# Patient Record
Sex: FEMALE | Race: BLACK OR AFRICAN AMERICAN | HISPANIC OR LATINO
[De-identification: names, ages, dates, MRNs, and addresses within clinical notes are randomized per-mention and may not be internally consistent; named-entity substitution may affect disease eponyms.]

---

## 2022-07-14 PROBLEM — Z00.00 ENCOUNTER FOR PREVENTIVE HEALTH EXAMINATION: Status: ACTIVE | Noted: 2022-07-14

## 2022-08-08 ENCOUNTER — APPOINTMENT (OUTPATIENT)
Dept: OTOLARYNGOLOGY | Facility: CLINIC | Age: 57
End: 2022-08-08

## 2022-08-08 VITALS
DIASTOLIC BLOOD PRESSURE: 84 MMHG | HEIGHT: 62 IN | TEMPERATURE: 97.8 F | OXYGEN SATURATION: 98 % | WEIGHT: 123 LBS | BODY MASS INDEX: 22.63 KG/M2 | SYSTOLIC BLOOD PRESSURE: 126 MMHG | HEART RATE: 90 BPM

## 2022-08-08 DIAGNOSIS — R43.0 ANOSMIA: ICD-10-CM

## 2022-08-08 PROCEDURE — 99203 OFFICE O/P NEW LOW 30 MIN: CPT | Mod: 25

## 2022-08-08 PROCEDURE — 31231 NASAL ENDOSCOPY DX: CPT

## 2022-08-08 NOTE — DATA REVIEWED
[de-identified] : –\par MRI brain 1/25/2022\par  impression: No evidence of planum sphenoidal tumor.  No evidence of intra or extra-axial enhancing neoplasm.  No suspicious meningeal enhancement.\par No evidence of demyelinating plaques, hemorrhage or acute infarction.\par   Few small areas of gliosis in the white matter of both cerebral hemispheres sequela most probably to longstanding hypertension or small vessel arthrosclerosis.\par Satisfactory pneumatization of the facial sinuses and mastoid cells.

## 2022-08-08 NOTE — PROCEDURE
[FreeTextEntry6] : -\par Procedure Note\par   \par Pre-operative Diagnosis:  anosmia\par Post-operative Diagnosis:   normal exam, no evidence of any obstruction of the olfactory cleft\par Anesthesia: Topical\par Procedure: Bilateral nasal endoscopy\par   \par Procedure Details: \par After topical anesthesia and decongestant, the patient was placed in the supine position. The telescope was passed along the left nasal floor to the nasopharynx. It was then passed into the region of the middle meatus, middle turbinate, and the sphenoethmoid region.  An identical procedure was performed on the right side. \par   \par Findings: \par Mucosa: 	                normal	\par Nasal septum: 	midline 	\par Discharge: 	none	\par Turbinates: 	normal	\par Adenoid: 	                normal	\par Posterior choanae: 	normal	\par Eustachian tubes: 	normal	\par Mucous stranding: 	normal 	\par Lesions: 	                Not present	\par   \par Comments: \par Condition: Stable. Patient tolerated procedure well.\par Complications: None\par \par

## 2022-08-08 NOTE — HISTORY OF PRESENT ILLNESS
[de-identified] :  56-year-old female who presents with concern for anosmia.  She states that her symptoms first began over 2 years ago.  At that time she notes that she had an upper respiratory infection with loss of smell and taste.  Her  had something similar but his taste returned.  While this was at the beginning of COVID she was tested and she was COVID-negative.  She denies any significant congestion, facial pain, dental pain, rhinorrhea.  To treat this she has seen an outside doctor and she did try smell training x1 month but then stopped as she saw no improvement.  She did have an MRI completed as well and this was negative for any sinonasal inflammatory disease or mass lesions.\par \par   Currently she is able to taste sweet, better, sour and salty.  She is however unable to differentiate different tastes for example a peach from a pair.

## 2022-08-08 NOTE — DATA REVIEWED
[de-identified] : –\par MRI brain 1/25/2022\par  impression: No evidence of planum sphenoidal tumor.  No evidence of intra or extra-axial enhancing neoplasm.  No suspicious meningeal enhancement.\par No evidence of demyelinating plaques, hemorrhage or acute infarction.\par   Few small areas of gliosis in the white matter of both cerebral hemispheres sequela most probably to longstanding hypertension or small vessel arthrosclerosis.\par Satisfactory pneumatization of the facial sinuses and mastoid cells.

## 2022-08-08 NOTE — HISTORY OF PRESENT ILLNESS
[de-identified] :  56-year-old female who presents with concern for anosmia.  She states that her symptoms first began over 2 years ago.  At that time she notes that she had an upper respiratory infection with loss of smell and taste.  Her  had something similar but his taste returned.  While this was at the beginning of COVID she was tested and she was COVID-negative.  She denies any significant congestion, facial pain, dental pain, rhinorrhea.  To treat this she has seen an outside doctor and she did try smell training x1 month but then stopped as she saw no improvement.  She did have an MRI completed as well and this was negative for any sinonasal inflammatory disease or mass lesions.\par \par   Currently she is able to taste sweet, better, sour and salty.  She is however unable to differentiate different tastes for example a peach from a pair.

## 2022-08-08 NOTE — ASSESSMENT
[FreeTextEntry1] : 56-year-old female who presents with anosmia.  Based on history and physical exam nontender sure why she is having the symptoms.  I suspect it was related to an upper respiratory infection, likely still COVID in my opinion despite testing negative very early on in the pandemic.  Imaging has been negative for a neurologic or sinonasal inflammatory process.  At this time I am recommending smell training for  a total of 5 months.  Patient to follow-up after to review results.\par \par – Smell training for full 5 months\par – Follow-up after to review results

## 2022-08-10 ENCOUNTER — APPOINTMENT (OUTPATIENT)
Dept: ORTHOPEDIC SURGERY | Facility: CLINIC | Age: 57
End: 2022-08-10

## 2022-08-10 VITALS — WEIGHT: 123 LBS | BODY MASS INDEX: 22.63 KG/M2 | HEIGHT: 62 IN

## 2022-08-10 DIAGNOSIS — Z80.51 FAMILY HISTORY OF MALIGNANT NEOPLASM OF KIDNEY: ICD-10-CM

## 2022-08-10 DIAGNOSIS — Z78.9 OTHER SPECIFIED HEALTH STATUS: ICD-10-CM

## 2022-08-10 DIAGNOSIS — Z80.0 FAMILY HISTORY OF MALIGNANT NEOPLASM OF DIGESTIVE ORGANS: ICD-10-CM

## 2022-08-10 DIAGNOSIS — Z86.39 PERSONAL HISTORY OF OTHER ENDOCRINE, NUTRITIONAL AND METABOLIC DISEASE: ICD-10-CM

## 2022-08-10 PROCEDURE — 72110 X-RAY EXAM L-2 SPINE 4/>VWS: CPT

## 2022-08-10 PROCEDURE — 99204 OFFICE O/P NEW MOD 45 MIN: CPT

## 2022-08-10 RX ORDER — LEVOTHYROXINE SODIUM 137 UG/1
TABLET ORAL
Refills: 0 | Status: ACTIVE | COMMUNITY

## 2022-08-10 NOTE — ASSESSMENT
[FreeTextEntry1] : 56 year old female presents with acute exacerbation of chronic back pain. The pain radiates down the back of her lower extremities. She is otherwise neurologically intact.We reviewed her imaging including a recent lumbar MRI. We discussed treatment options including physical therapy, medications, spinal injections and surgery. The patient was given a referral for physical therapy. The patient was given a referral for consideration for spinal injections. She was given prescriptions for diclofenac, cyclobenzaprine and gabapentin. She will followup in 4-6 weeks. We discussed red flag symptoms that would require emergent evaluation. She knows to call with any questions or concerns or if her symptoms acutely worsen.

## 2022-08-10 NOTE — PHYSICAL EXAM
[de-identified] : General: No acute distress, conversant, well-nourished.\par Head: Normocephalic, atraumatic\par Neck: trachea midline, FROM\par Heart: normotensive and normal rate and rhythm\par Lungs: No labored breathing\par Skin: No abrasions, no rashes, no edema\par Psych: Alert and oriented to person, place and time\par Extremities: no peripheral edema or digital cyanosis\par Gait: Normal gait. Can perform tandem gait.  \par Vascular: warm and well perfused distally, palpable distal pulses\par \par MSK:\par Lumbar spine:\par No tenderness to palpation.  No step-off, no deformity.\par \par NEURO EXAM:\par Sensation \par Left L2  -  2/2            \par Left L3  -  2/2\par Left L4  -  2/2\par Left L5  -  2/2\par Left S1  -  2/2\par \par Right L2  -  2/2            \par Right L3  -  2/2\par Right L4  -  2/2\par Right L5  -  2/2\par Right S1  -  2/2\par \par Motor: \par Left L2 (hip flexion)                            5/5                \par Left L3 (knee extension)                   5/5                \par Left L4 (ankle dorsiflexion)                 5/5                \par Left L5 (long toe extensor)                5/5                \par Left S1 (ankle plantar flexion)           5/5\par \par Right L2 (hip flexion)                            5/5                \par Right L3 (knee extension)                   5/5                \par Right L4 (ankle dorsiflexion)                 5/5                \par Right L5 (long toe extensor)                5/5                \par Right S1 (ankle plantar flexion)           5/5\par \par Reflexes: Normal and symmetric\par Negative clonus.  Down-going Babinski.   [de-identified] : I ordered radiographs to evaluate the patient's symptoms.\par \par Lumbar 4 view radiographs taken in the office today show no dislocation or fracture.  Lumbar spondylosis.  L4-L5 spondylolisthesis with mild movement on dynamic series.\par \par Lumbar MRI (8/3/22): Degenerative changes, L4-L5 anterolisthesis. Multilevel stenosis. No compression of neural elements.

## 2022-08-10 NOTE — HISTORY OF PRESENT ILLNESS
[de-identified] : 56 year old female presents with acute exacerbation of chronic back pain. The pain radiates down the back of her lower extremities. She denies recent illness, fevers, numbness, weakness, balance problems, saddle anesthesia, urinary retention or fecal incontinence. She had spinal injections in the past which helped and is interested in injections.  She takes ibuprofen without relief.  She has tried PT in the past without improvement.  She has a recent lumbar MRI but without the report.

## 2022-08-31 ENCOUNTER — APPOINTMENT (OUTPATIENT)
Dept: PHYSICAL MEDICINE AND REHAB | Facility: CLINIC | Age: 57
End: 2022-08-31

## 2022-08-31 DIAGNOSIS — M51.26 OTHER INTERVERTEBRAL DISC DISPLACEMENT, LUMBAR REGION: ICD-10-CM

## 2022-08-31 PROCEDURE — 99203 OFFICE O/P NEW LOW 30 MIN: CPT

## 2022-09-01 NOTE — PHYSICAL EXAM
[FreeTextEntry1] : DOUGLAS is a 56 year old female \par Constitutional: healthy appearing, NAD, and normal body habitus\par \par LUMBAR\par ROM: flexion to 30 deg, ext to 5 deg\par \par Gait: normal\par \par Inspection: no erythema, warmth\par Spine: no TTP in spinous process\par Bony palpation: no TTP in GT\par \par Soft tissue palpation hip: no TTP in gluteus girish\par Soft tissue palpation of spine: no TTP in lumbar paraspinals\par \par 5/5 bilateral KE, DF, PF \par sensation intact in bilat LE\par 0 ankle reflex\par \par Special tests: neg seated SLR\par \par

## 2022-09-01 NOTE — ASSESSMENT
[FreeTextEntry1] : MRI shows L4/5 stenosis with listhesis.\par \par Discussed diagnosis and treatment plan including PT.\par Discussed HOLLY again given radicular pain.  She elects to do it.  Stop nsaids 2 days before.\par Limit sitting.  Taught hamstring stretch.

## 2022-09-01 NOTE — HISTORY OF PRESENT ILLNESS
[FreeTextEntry1] : Location: back\par Severity: moderate\par Duration: years\par Aggravating Factors: walking\par Alleviating Factors: HOLLY helped significantly years ago\par Associated Symptoms: denies weight loss, fever, chills, change in bowel/bladder habits, weakness, numbness/tingling, +radiation down legs\par Prior Studies: MRI\par

## 2022-09-14 ENCOUNTER — APPOINTMENT (OUTPATIENT)
Dept: ORTHOPEDIC SURGERY | Facility: CLINIC | Age: 57
End: 2022-09-14

## 2022-10-05 ENCOUNTER — RX RENEWAL (OUTPATIENT)
Age: 57
End: 2022-10-05

## 2022-10-10 ENCOUNTER — RX RENEWAL (OUTPATIENT)
Age: 57
End: 2022-10-10

## 2022-11-07 ENCOUNTER — APPOINTMENT (OUTPATIENT)
Dept: OTOLARYNGOLOGY | Facility: CLINIC | Age: 57
End: 2022-11-07

## 2022-12-04 ENCOUNTER — RX RENEWAL (OUTPATIENT)
Age: 57
End: 2022-12-04

## 2022-12-14 ENCOUNTER — APPOINTMENT (OUTPATIENT)
Dept: PHYSICAL MEDICINE AND REHAB | Facility: CLINIC | Age: 57
End: 2022-12-14

## 2022-12-14 PROCEDURE — 99214 OFFICE O/P EST MOD 30 MIN: CPT

## 2022-12-14 NOTE — PHYSICAL EXAM
[FreeTextEntry1] : DOUGLAS is a 57 year old female \par Constitutional: healthy appearing, NAD, and normal body habitus\par \par LUMBAR\par ROM: flexion to 30 deg, ext to 5 deg\par \par Gait: normal\par \par Inspection: no erythema, warmth\par \par Soft tissue palpation hip: no TTP in gluteus girish\par Soft tissue palpation of spine: no TTP in lumbar paraspinals\par \par 5/5 bilateral KE, DF, PF \par sensation intact in bilat LE\par

## 2022-12-14 NOTE — ASSESSMENT
[FreeTextEntry1] : MRI shows L4/5 stenosis with listhesis.\par \par Discussed diagnosis and treatment plan including PT.\par Educated to do HEP regularly even if pain improves.\par Does not want injections yet.  Consider facet injections\par Will keep meds the same since has minimal pain with 900 mg Gabapentin and cyclobenzaprine qhs prn.\par \par Seen and examined with fellow Dr Duglas Kemp.

## 2022-12-14 NOTE — HISTORY OF PRESENT ILLNESS
[FreeTextEntry1] : Location: back\par Severity: mild with meds\par Duration: years\par Aggravating Factors: walking\par Alleviating Factors: HOLLY helped significantly years ago\par Associated Symptoms: denies weight loss, fever, chills, change in bowel/bladder habits, weakness, numbness/tingling, +radiation down legs\par Prior Studies: MRI 2022

## 2023-08-16 ENCOUNTER — RX RENEWAL (OUTPATIENT)
Age: 58
End: 2023-08-16

## 2023-08-22 ENCOUNTER — APPOINTMENT (OUTPATIENT)
Dept: PHYSICAL MEDICINE AND REHAB | Facility: CLINIC | Age: 58
End: 2023-08-22
Payer: COMMERCIAL

## 2023-08-22 PROCEDURE — 72040 X-RAY EXAM NECK SPINE 2-3 VW: CPT

## 2023-08-22 PROCEDURE — 99214 OFFICE O/P EST MOD 30 MIN: CPT

## 2023-08-22 NOTE — ASSESSMENT
[FreeTextEntry1] : MRI shows L4/5 stenosis with listhesis.  Discussed diagnosis and treatment plan including PT. Educated to do HEP regularly even if pain improves. continue Gabapentin 1 qam and 2 qhs, and cyclobenzaprine qhs prn. Needs to do more bridges. She has tried conservative tx including PT, nsaids for 3 months without relief. Discussed HOLLY in detail.  Risks include bleeding.  Stop nsaids 2 days before.  She will continue a comprehensive rehabilitation program afterward, as this is important to prevent recurrence of pain.

## 2023-08-22 NOTE — PHYSICAL EXAM
[FreeTextEntry1] : DOUGLAS is a 57 year old female  Constitutional: healthy appearing, NAD, and normal body habitus   LUMBAR  ROM: flexion to 30 deg, ext to 5 deg  Gait: normal  Inspection: no erythema, warmth  Soft tissue palpation hip: no TTP in gluteus girish Soft tissue palpation of spine: no TTP in lumbar paraspinals  5/5 bilateral KE, DF, PF sensation intact in bilat LE   NECK ROM: flexion to 30, ext to 30, rotation to 60 deg bilat  Inspection: no erythema, warmth Spine: no TTP in spinous process Soft tissue palpation: no TTP in cervical paraspinals, rhomboids, trapezius  5/5 bilateral elb flex/ext, WE, finger abd/flex sensation intact in bilat UE  Special tests: neg Spurling, Robertson

## 2023-08-22 NOTE — DATA REVIEWED
[FreeTextEntry1] : office xrays of cervical ap and lateral show mild DDD C3/4 and moderate DDD C5/6.

## 2023-08-22 NOTE — HISTORY OF PRESENT ILLNESS
[FreeTextEntry1] : Location: back Severity: 8/10, worsening and had to go to urgent care Duration: years Aggravating Factors: walking Alleviating Factors: HOLLY helped significantly years ago Associated Symptoms: denies weight loss, fever, chills, change in bowel/bladder habits, weakness, numbness/tingling, +radiation down legs, tightness in legs at night Prior Studies: MRI 2022  Arms are tight lately.   No pattern to it. No numbness.

## 2023-08-29 ENCOUNTER — APPOINTMENT (OUTPATIENT)
Dept: PHYSICAL MEDICINE AND REHAB | Facility: CLINIC | Age: 58
End: 2023-08-29
Payer: COMMERCIAL

## 2023-08-29 PROCEDURE — 62323 NJX INTERLAMINAR LMBR/SAC: CPT

## 2023-08-29 NOTE — PROCEDURE
[de-identified] : indication: pain   Fluoroscopically Guided, Contrast Enhanced, Caudal Epidural Steroid Injection   After informed consent, she was placed prone on the fluoroscopy table.  Skin over the area was prepped and draped in the usual sterile fashion before being anesthetized with 1% Lidocaine.   Then a 22 gauge 2 1/2 in needle was directed down to the sacrococcygeal ligament, sacral hiatus.  Needle placement was confirmed with AP and lateral fluoroscopic images.  After negative aspiration for blood or cerebrospinal fluid, contrast was instilled under live fluoroscopy and an epidurogram was obtained.  It showed good epidural flow without any vascular uptake.  Then a steroid solution consisting of 40 mg of Kenalog, 4 ml of 0.5% Lidocaine was instilled.  Following the procedure, the needle was removed and the skin was cleaned, the skin was cleaned, and a sterile dressing was applied.  She  tolerated the procedure well and was discharged in good condition without any complications or complaints.  She was given discharge instructions and asked to follow-up in clinic in two weeks.   Manufacture GE Omnipaque 180 NDC [id number] Exp 12/15/25 [id number]    Triamcinolone NDC [address]-6 Exp 6/24 Lot [id number] Manufacture [employer]ne[employer]   0.5% Lidocaine [facility] [id number] Lot [id number] 2/1/24

## 2023-09-12 ENCOUNTER — APPOINTMENT (OUTPATIENT)
Dept: PHYSICAL MEDICINE AND REHAB | Facility: CLINIC | Age: 58
End: 2023-09-12

## 2023-10-01 ENCOUNTER — RX RENEWAL (OUTPATIENT)
Age: 58
End: 2023-10-01

## 2023-10-02 ENCOUNTER — APPOINTMENT (OUTPATIENT)
Dept: ULTRASOUND IMAGING | Facility: CLINIC | Age: 58
End: 2023-10-02
Payer: COMMERCIAL

## 2023-10-02 ENCOUNTER — OUTPATIENT (OUTPATIENT)
Dept: OUTPATIENT SERVICES | Facility: HOSPITAL | Age: 58
LOS: 1 days | End: 2023-10-02

## 2023-10-02 PROCEDURE — 76830 TRANSVAGINAL US NON-OB: CPT | Mod: 26

## 2023-11-15 RX ORDER — GABAPENTIN 300 MG/1
300 CAPSULE ORAL
Qty: 90 | Refills: 1 | Status: ACTIVE | COMMUNITY
Start: 2023-11-15 | End: 1900-01-01

## 2023-11-29 ENCOUNTER — RX RENEWAL (OUTPATIENT)
Age: 58
End: 2023-11-29

## 2023-11-29 RX ORDER — GABAPENTIN 300 MG/1
300 CAPSULE ORAL
Qty: 270 | Refills: 0 | Status: ACTIVE | COMMUNITY
Start: 2022-08-10 | End: 1900-01-01

## 2024-01-10 ENCOUNTER — RX RENEWAL (OUTPATIENT)
Age: 59
End: 2024-01-10

## 2024-01-10 RX ORDER — DICLOFENAC SODIUM 75 MG/1
75 TABLET, DELAYED RELEASE ORAL
Qty: 60 | Refills: 1 | Status: ACTIVE | COMMUNITY
Start: 2022-08-10 | End: 1900-01-01

## 2024-02-05 ENCOUNTER — RX RENEWAL (OUTPATIENT)
Age: 59
End: 2024-02-05

## 2024-02-21 ENCOUNTER — APPOINTMENT (OUTPATIENT)
Dept: ORTHOPEDIC SURGERY | Facility: CLINIC | Age: 59
End: 2024-02-21
Payer: COMMERCIAL

## 2024-02-21 PROCEDURE — 99214 OFFICE O/P EST MOD 30 MIN: CPT

## 2024-02-21 PROCEDURE — 72110 X-RAY EXAM L-2 SPINE 4/>VWS: CPT

## 2024-02-26 ENCOUNTER — RX RENEWAL (OUTPATIENT)
Age: 59
End: 2024-02-26

## 2024-02-26 RX ORDER — GABAPENTIN 300 MG/1
300 CAPSULE ORAL
Qty: 270 | Refills: 0 | Status: ACTIVE | COMMUNITY
Start: 2023-11-30 | End: 1900-01-01

## 2024-03-01 ENCOUNTER — APPOINTMENT (OUTPATIENT)
Dept: MRI IMAGING | Facility: CLINIC | Age: 59
End: 2024-03-01
Payer: COMMERCIAL

## 2024-03-01 ENCOUNTER — OUTPATIENT (OUTPATIENT)
Dept: OUTPATIENT SERVICES | Facility: HOSPITAL | Age: 59
LOS: 1 days | End: 2024-03-01

## 2024-03-01 PROCEDURE — 72148 MRI LUMBAR SPINE W/O DYE: CPT | Mod: 26

## 2024-04-03 NOTE — PHYSICAL EXAM
[de-identified] : General: No acute distress, conversant, well-nourished.\par  Head: Normocephalic, atraumatic\par  Neck: trachea midline, FROM\par  Heart: normotensive and normal rate and rhythm\par  Lungs: No labored breathing\par  Skin: No abrasions, no rashes, no edema\par  Psych: Alert and oriented to person, place and time\par  Extremities: no peripheral edema or digital cyanosis\par  Gait: Normal gait. Can perform tandem gait.  \par  Vascular: warm and well perfused distally, palpable distal pulses\par  \par  MSK:\par  Lumbar spine:\par  No tenderness to palpation.  No step-off, no deformity.\par  \par  NEURO EXAM:\par  Sensation \par  Left L2  -  2/2            \par  Left L3  -  2/2\par  Left L4  -  2/2\par  Left L5  -  2/2\par  Left S1  -  2/2\par  \par  Right L2  -  2/2            \par  Right L3  -  2/2\par  Right L4  -  2/2\par  Right L5  -  2/2\par  Right S1  -  2/2\par  \par  Motor: \par  Left L2 (hip flexion)                            5/5                \par  Left L3 (knee extension)                   5/5                \par  Left L4 (ankle dorsiflexion)                 5/5                \par  Left L5 (long toe extensor)                5/5                \par  Left S1 (ankle plantar flexion)           5/5\par  \par  Right L2 (hip flexion)                            5/5                \par  Right L3 (knee extension)                   5/5                \par  Right L4 (ankle dorsiflexion)                 5/5                \par  Right L5 (long toe extensor)                5/5                \par  Right S1 (ankle plantar flexion)           5/5\par  \par  Reflexes: Normal and symmetric\par  Negative clonus.  Down-going Babinski.   [de-identified] : I ordered radiographs to evaluate the patient's symptoms. Lumbar 4 view radiographs obtained in the office today show no dislocation or fracture.  Lumbar spondylosis.  L4-L5 spondylolisthesis with mild movement on dynamic series.  Lumbar MRI (8/3/22): Degenerative changes, L4-L5 anterolisthesis. Multilevel stenosis. No compression of neural elements.

## 2024-04-03 NOTE — ASSESSMENT
[FreeTextEntry1] : 56 year old female followup with acute exacerbation of chronic back pain. The pain radiates down the back of her lower extremities. She denies recent illness, fevers, numbness, weakness, balance problems, saddle anesthesia, urinary retention or fecal incontinence. Since her last visit pain has continued. Epidural did not provide much relief. She will be sent for a new lumbar MRI.  She can continue PT. She can consider additional spinal injections.  Continue cyclobenzaprine and gabapentin. F/U after MRI. We discussed red flag symptoms that would require emergent evaluation. She knows to call with any questions or concerns or if her symptoms acutely worsen.

## 2024-04-03 NOTE — HISTORY OF PRESENT ILLNESS
[de-identified] : 56 year old female followup with acute exacerbation of chronic back pain. The pain radiates down the back of her lower extremities. She denies recent illness, fevers, numbness, weakness, balance problems, saddle anesthesia, urinary retention or fecal incontinence. Since her last visit pain has continued. Epidural did not provide much relief.

## 2024-04-09 ENCOUNTER — APPOINTMENT (OUTPATIENT)
Dept: ORTHOPEDIC SURGERY | Facility: CLINIC | Age: 59
End: 2024-04-09
Payer: COMMERCIAL

## 2024-04-09 ENCOUNTER — RX RENEWAL (OUTPATIENT)
Age: 59
End: 2024-04-09

## 2024-04-09 DIAGNOSIS — M47.816 SPONDYLOSIS W/OUT MYELOPATHY OR RADICULOPATHY, LUMBAR REGION: ICD-10-CM

## 2024-04-09 DIAGNOSIS — G89.29 LOW BACK PAIN, UNSPECIFIED: ICD-10-CM

## 2024-04-09 DIAGNOSIS — M48.061 SPINAL STENOSIS, LUMBAR REGION WITHOUT NEUROGENIC CLAUDICATION: ICD-10-CM

## 2024-04-09 DIAGNOSIS — M54.50 LOW BACK PAIN, UNSPECIFIED: ICD-10-CM

## 2024-04-09 DIAGNOSIS — M43.16 SPONDYLOLISTHESIS, LUMBAR REGION: ICD-10-CM

## 2024-04-09 DIAGNOSIS — M54.12 RADICULOPATHY, CERVICAL REGION: ICD-10-CM

## 2024-04-09 DIAGNOSIS — M54.16 RADICULOPATHY, LUMBAR REGION: ICD-10-CM

## 2024-04-09 PROCEDURE — 99214 OFFICE O/P EST MOD 30 MIN: CPT

## 2024-04-09 RX ORDER — CYCLOBENZAPRINE HYDROCHLORIDE 10 MG/1
10 TABLET, FILM COATED ORAL
Qty: 42 | Refills: 2 | Status: ACTIVE | COMMUNITY
Start: 2024-04-09 | End: 1900-01-01

## 2024-04-09 RX ORDER — CYCLOBENZAPRINE HYDROCHLORIDE 5 MG/1
5 TABLET, FILM COATED ORAL 3 TIMES DAILY
Qty: 45 | Refills: 0 | Status: ACTIVE | COMMUNITY
Start: 2022-08-10 | End: 1900-01-01

## 2024-04-09 RX ORDER — DICLOFENAC SODIUM 75 MG/1
75 TABLET, DELAYED RELEASE ORAL
Qty: 60 | Refills: 1 | Status: ACTIVE | COMMUNITY
Start: 2024-04-09 | End: 1900-01-01

## 2024-04-15 PROBLEM — M43.16 SPONDYLOLISTHESIS AT L4-L5 LEVEL: Status: ACTIVE | Noted: 2024-04-15

## 2024-04-15 PROBLEM — M47.816 LUMBAR SPONDYLOSIS: Status: ACTIVE | Noted: 2022-12-14

## 2024-04-15 PROBLEM — M48.061 LUMBAR STENOSIS: Status: ACTIVE | Noted: 2022-08-10

## 2024-04-15 PROBLEM — M54.16 LUMBAR RADICULOPATHY: Status: ACTIVE | Noted: 2022-08-10

## 2024-04-15 PROBLEM — M54.12 CERVICAL RADICULITIS: Status: ACTIVE | Noted: 2023-08-22

## 2024-04-15 PROBLEM — M54.50 CHRONIC LOW BACK PAIN: Status: ACTIVE | Noted: 2022-08-10

## 2024-04-15 NOTE — ASSESSMENT
[FreeTextEntry1] : 58 year old female followup with acute exacerbation of chronic back pain. The pain radiates down the back of her lower extremities. She denies recent illness, fevers, numbness, weakness, balance problems, saddle anesthesia, urinary retention or fecal incontinence. Since her last visit pain has continued. She has tried PT, spinal injections and medications without sustained relief. I independently reviewed her lumbar MRI (3/1/24) which showed degenerative changes including L4-L5 spondylolisthesis with stenosis.  We discussed treatment options including physical therapy, medications, spinal injections and surgery.  Surgery would be L4-L5 TLIF. We discussed the risks and benefits of surgery. The risks include anesthesia, blood loss, need for blood transfusion, clots, stroke, myocardial infarct, chronic pain, loss of function, need for reoperation, nonunion, hardware failure, dysphagia, dysphonia, durotomy, infection and damage to neurovascular structures.  She understands the risks. She asked several great questions all of which were answered. She will be scheduled for surgery. Continue PT.  Cyclobenzaprine prn. We discussed red flag symptoms that would require emergent evaluation. She knows to call with any questions or concerns or if her symptoms acutely worsen.

## 2024-04-15 NOTE — HISTORY OF PRESENT ILLNESS
[de-identified] : 58 year old female followup with acute exacerbation of chronic back pain. The pain radiates down the back of her lower extremities. She denies recent illness, fevers, numbness, weakness, balance problems, saddle anesthesia, urinary retention or fecal incontinence. Since her last visit pain has continued. She has tried PT, spinal injections and medications without sustained relief. She is here to review her MRI.

## 2024-04-15 NOTE — PHYSICAL EXAM
[de-identified] : General: No acute distress, conversant, well-nourished.\par  Head: Normocephalic, atraumatic\par  Neck: trachea midline, FROM\par  Heart: normotensive and normal rate and rhythm\par  Lungs: No labored breathing\par  Skin: No abrasions, no rashes, no edema\par  Psych: Alert and oriented to person, place and time\par  Extremities: no peripheral edema or digital cyanosis\par  Gait: Normal gait. Can perform tandem gait.  \par  Vascular: warm and well perfused distally, palpable distal pulses\par  \par  MSK:\par  Lumbar spine:\par  No tenderness to palpation.  No step-off, no deformity.\par  \par  NEURO EXAM:\par  Sensation \par  Left L2  -  2/2            \par  Left L3  -  2/2\par  Left L4  -  2/2\par  Left L5  -  2/2\par  Left S1  -  2/2\par  \par  Right L2  -  2/2            \par  Right L3  -  2/2\par  Right L4  -  2/2\par  Right L5  -  2/2\par  Right S1  -  2/2\par  \par  Motor: \par  Left L2 (hip flexion)                            5/5                \par  Left L3 (knee extension)                   5/5                \par  Left L4 (ankle dorsiflexion)                 5/5                \par  Left L5 (long toe extensor)                5/5                \par  Left S1 (ankle plantar flexion)           5/5\par  \par  Right L2 (hip flexion)                            5/5                \par  Right L3 (knee extension)                   5/5                \par  Right L4 (ankle dorsiflexion)                 5/5                \par  Right L5 (long toe extensor)                5/5                \par  Right S1 (ankle plantar flexion)           5/5\par  \par  Reflexes: Normal and symmetric\par  Negative clonus.  Down-going Babinski.   [de-identified] : I independently reviewed her lumbar MRI (3/1/24) which showed degenerative changes including L4-L5 spondylolisthesis with stenosis.    Lumbar 4 view radiographs no dislocation or fracture.  Lumbar spondylosis.  L4-L5 spondylolisthesis with mild movement on dynamic series.

## 2024-07-12 ENCOUNTER — RX RENEWAL (OUTPATIENT)
Age: 59
End: 2024-07-12

## 2024-08-08 ENCOUNTER — RX RENEWAL (OUTPATIENT)
Age: 59
End: 2024-08-08

## 2024-08-16 ENCOUNTER — RX RENEWAL (OUTPATIENT)
Age: 59
End: 2024-08-16

## 2024-09-04 ENCOUNTER — RX RENEWAL (OUTPATIENT)
Age: 59
End: 2024-09-04

## 2024-10-29 ENCOUNTER — RX RENEWAL (OUTPATIENT)
Age: 59
End: 2024-10-29

## 2024-11-06 RX ORDER — GABAPENTIN 300 MG/1
300 CAPSULE ORAL
Qty: 90 | Refills: 1 | Status: ACTIVE | COMMUNITY
Start: 2024-11-06 | End: 1900-01-01

## 2024-11-11 ENCOUNTER — RX RENEWAL (OUTPATIENT)
Age: 59
End: 2024-11-11

## 2024-11-11 RX ORDER — GABAPENTIN 300 MG/1
300 CAPSULE ORAL
Qty: 90 | Refills: 1 | Status: ACTIVE | COMMUNITY
Start: 2024-11-11 | End: 1900-01-01

## 2024-11-14 ENCOUNTER — APPOINTMENT (OUTPATIENT)
Dept: ORTHOPEDIC SURGERY | Facility: CLINIC | Age: 59
End: 2024-11-14

## 2024-12-06 ENCOUNTER — RX RENEWAL (OUTPATIENT)
Age: 59
End: 2024-12-06

## 2024-12-26 ENCOUNTER — APPOINTMENT (OUTPATIENT)
Dept: ORTHOPEDIC SURGERY | Facility: CLINIC | Age: 59
End: 2024-12-26
Payer: COMMERCIAL

## 2024-12-26 DIAGNOSIS — M54.16 RADICULOPATHY, LUMBAR REGION: ICD-10-CM

## 2024-12-26 DIAGNOSIS — M48.061 SPINAL STENOSIS, LUMBAR REGION WITHOUT NEUROGENIC CLAUDICATION: ICD-10-CM

## 2024-12-26 DIAGNOSIS — M43.16 SPONDYLOLISTHESIS, LUMBAR REGION: ICD-10-CM

## 2024-12-26 DIAGNOSIS — M47.816 SPONDYLOSIS W/OUT MYELOPATHY OR RADICULOPATHY, LUMBAR REGION: ICD-10-CM

## 2024-12-26 PROCEDURE — 99214 OFFICE O/P EST MOD 30 MIN: CPT

## 2024-12-26 RX ORDER — CYCLOBENZAPRINE HYDROCHLORIDE 5 MG/1
5 TABLET, FILM COATED ORAL 3 TIMES DAILY
Qty: 42 | Refills: 1 | Status: ACTIVE | COMMUNITY
Start: 2024-12-26 | End: 1900-01-01

## 2024-12-26 RX ORDER — GABAPENTIN 300 MG/1
300 CAPSULE ORAL 3 TIMES DAILY
Qty: 90 | Refills: 0 | Status: ACTIVE | COMMUNITY
Start: 2024-12-26 | End: 1900-01-01

## 2025-02-04 ENCOUNTER — APPOINTMENT (OUTPATIENT)
Dept: ORTHOPEDIC SURGERY | Facility: CLINIC | Age: 60
End: 2025-02-04

## 2025-02-14 RX ORDER — CYCLOBENZAPRINE HYDROCHLORIDE 5 MG/1
5 TABLET, FILM COATED ORAL
Qty: 90 | Refills: 1 | Status: ACTIVE | COMMUNITY
Start: 2025-02-14 | End: 1900-01-01

## 2025-04-28 ENCOUNTER — RX RENEWAL (OUTPATIENT)
Age: 60
End: 2025-04-28

## 2025-05-27 ENCOUNTER — RX RENEWAL (OUTPATIENT)
Age: 60
End: 2025-05-27

## 2025-06-09 RX ORDER — GABAPENTIN 300 MG/1
300 CAPSULE ORAL
Qty: 270 | Refills: 0 | Status: ACTIVE | COMMUNITY
Start: 2025-06-09 | End: 1900-01-01

## 2025-09-01 ENCOUNTER — RX RENEWAL (OUTPATIENT)
Age: 60
End: 2025-09-01

## 2025-09-04 ENCOUNTER — RX RENEWAL (OUTPATIENT)
Age: 60
End: 2025-09-04